# Patient Record
Sex: FEMALE | Race: BLACK OR AFRICAN AMERICAN | Employment: OTHER | URBAN - METROPOLITAN AREA
[De-identification: names, ages, dates, MRNs, and addresses within clinical notes are randomized per-mention and may not be internally consistent; named-entity substitution may affect disease eponyms.]

---

## 2017-01-04 ENCOUNTER — HOSPITAL ENCOUNTER (OUTPATIENT)
Dept: PHYSICAL THERAPY | Age: 72
Discharge: HOME | End: 2017-01-04
Payer: MEDICARE

## 2017-01-04 PROCEDURE — 97110 THERAPEUTIC EXERCISES: CPT

## 2017-01-04 PROCEDURE — 97112 NEUROMUSCULAR REEDUCATION: CPT

## 2017-01-04 PROCEDURE — 97014 ELECTRIC STIMULATION THERAPY: CPT

## 2017-01-04 NOTE — PROGRESS NOTES
PT DAILY TREATMENT NOTE - [facility]     Patient Name: Armond Chan  Date:2017  : 1945  [x]  Patient  Verified  Payor: Ana Paula Main / Plan: [place] MED[place] B / Product Type: Medicare /    In time:230  Out time:313  Total Treatment Time (min): 43  Total Timed Codes (min): 33  1:1 Treatment Time ( [address] only): 33  Visit #: 2 of 8    Treatment Area: Pain in right hip [M25.551]    SUBJECTIVE  Pain Level (0-10 scale): 0  Any medication changes, allergies to medications, adverse drug reactions, diagnosis change, or new procedure performed?: [x] No    [] Yes (see summary sheet for update)  Subjective functional status/changes:   [] No changes reported  \"Not having pain now. \"    OBJECTIVE    Modality rationale: decrease pain and increase tissue extensibility to improve the patients ability to improve ease with mobility.    Min Type Additional Details   10 [x] Estim:  [x]Unatt       [x]IFC  []Premod                        []Other:  []w/ice   [x]w/heat  Position: seated  Location:R low back    [] Estim: []Att    []TENS instruct  []NMES                    []Other:  []w/US   []w/ice   []w/heat  Position:  Location:    []  Traction: [] Cervical       []Lumbar                       [] Prone          []Supine                       []Intermittent   []Continuous Lbs:  [] before manual  [] after manual    []  Ultrasound: []Continuous   [] Pulsed                           []1MHz   []3MHz Location:  W/cm2:    []  Iontophoresis with dexamethasone         Location: [] Take home patch   [] In clinic    []  Ice     []  heat  []  Ice massage  []  Laser   []  Anodyne Position:  Location:    []  Laser with stim  []  Other: Position:  Location:    []  Vasopneumatic Device Pressure:       [] lo [] med [] hi   Temperature: [] lo [] med [] hi   [x] Skin assessment post-treatment:  [x]intact []redness- no adverse reaction    []redness  adverse reaction:       8 min Manual Therapy:  Gentle STM to piriformis, gluts, & R l/s paraspinals Rationale: decrease pain, increase ROM, increase tissue extensibility, decrease trigger points and increase postural awareness to improve ease with mobility. 25 min Neuromuscular Re-education:  [x]  See flow sheet :balance training, weight shifting activities. Rationale: increase ROM, increase strength, improve coordination, improve balance and increase proprioception  to decrease fall risk. With   [] TE   [] TA   [] neuro   [] other: Patient Education: [x] Review HEP    [] Progressed/Changed HEP based on:   [] positioning   [] body mechanics   [] transfers   [] heat/ice application    [] other:      Other Objective/Functional Measures:      Pain Level (0-10 scale) post treatment: 0    ASSESSMENT/Changes in Function:    Patient will continue to benefit from skilled PT services to modify and progress therapeutic interventions, address functional mobility deficits, address ROM deficits, address strength deficits, analyze and address soft tissue restrictions, analyze and cue movement patterns, analyze and modify body mechanics/ergonomics, assess and modify postural abnormalities, address imbalance/dizziness and instruct in home and community integration to attain remaining goals. []  See Plan of Care  []  See progress note/recertification  []  See Discharge Summary         Progress towards goals / Updated goals:  Short Term Goals: To be accomplished in 1 weeks:  1. Pt will be IND and compliant with HEP for self-management of symptoms. Long Term Goals: To be accomplished in 10 treatments:  1. Pt will improve B hip strength to 5/5 to improve functional gait tolerance. 2. Pt will be able to perform sit to stand transfers without pain from a low chair to improve functional transfer tolerance. 3. Pt will present to the clinic for 3 consecutive sessions with level pelvis as an indicator of improved stability.   4. Pt will improve FOTO score by 10 points as an indicator of improved activity tolerance. PLAN  []  Upgrade activities as tolerated     [x]  Continue plan of care  []  Update interventions per flow sheet       []  Discharge due to:_  []  Other:_      Rachel Luo, PT 1/4/2017  2:42 PM    No future appointments.

## 2017-01-12 ENCOUNTER — HOSPITAL ENCOUNTER (OUTPATIENT)
Dept: PHYSICAL THERAPY | Age: 72
Discharge: HOME | End: 2017-01-12
Payer: MEDICARE

## 2017-01-12 PROCEDURE — 97112 NEUROMUSCULAR REEDUCATION: CPT

## 2017-01-12 PROCEDURE — 97014 ELECTRIC STIMULATION THERAPY: CPT

## 2017-01-12 NOTE — PROGRESS NOTES
PT DAILY TREATMENT NOTE - [facility]     Patient Name: Herbert Andrews  Date:2017  : 1945  [x]  Patient  Verified  Payor: Robert Many / Plan: [place] MEDICARE PART B / Product Type: Medicare /    In time:305  Out time:354  Total Treatment Time (min): 49  Total Timed Codes (min): 39  1:1 Treatment Time ( [address] only): 25   Visit #: 3 of 10    Treatment Area: Pain in right hip [M25.551]    SUBJECTIVE  Pain Level (0-10 scale):  2  Any medication changes, allergies to medications, adverse drug reactions, diagnosis change, or new procedure performed?: [x] No    [] Yes (see summary sheet for update)  Subjective functional status/changes:   [] No changes reported  It is getting much better,     OBJECTIVE    Modality rationale: decrease inflammation, decrease pain and increase tissue extensibility to improve the patients ability to regain standing and activity tolerance    Min Type Additional Details   10 [x] Estim:  [x]Unatt       [x]IFC  []Premod                        []Other:  []w/ice   [x]w/heat  Position:setaed  Location:LB and SIJ    [] Estim: []Att    []TENS instruct  []NMES                    []Other:  []w/US   []w/ice   []w/heat  Position:  Location:    []  Traction: [] Cervical       []Lumbar                       [] Prone          []Supine                       []Intermittent   []Continuous Lbs:  [] before manual  [] after manual    []  Ultrasound: []Continuous   [] Pulsed                           []1MHz   []3MHz W/cm2:  Location:    []  Iontophoresis with dexamethasone         Location: [] Take home patch   [] In clinic    []  Ice     []  heat  []  Ice massage  []  Laser   []  Anodyne Position:  Location:    []  Laser with stim  []  Other:  Position:  Location:    []  Vasopneumatic Device Pressure:       [] lo [] med [] hi   Temperature: [] lo [] med [] hi   [x] Skin assessment post-treatment:  [x]intact []redness- no adverse reaction    []redness  adverse reaction:            29 min Neuromuscular Re-education:  [x]  See flow sheet :  ADDED lateral ambulation with emphasis on upright posture, minisquats with manual assistance for form   Rationale: increase ROM, increase strength, improve coordination, improve balance and increase proprioception to decrease fall risk. 10 min Manual Therapy:  MFR pelvic diaphragm, stm glut, piriformis, QL    Rationale: decrease pain, increase ROM, increase tissue extensibility, decrease trigger points and increase postural awareness to improve ease with mobility.              With   [] TE   [] TA   [x] neuro   [] other: Patient Education: [x] Review HEP    [] Progressed/Changed HEP based on:   [x] positioning   [x] body mechanics   [] transfers   [] heat/ice application    [] other:      Other Objective/Functional Measures: Right ant innominate with + Fabers      Pain Level (0-10 scale) post treatment: 1    ASSESSMENT/Changes in Function: patient very pleasant and cooperative, motivated to improve . Seeing good response despite not maintaining alignment   Patient will continue to benefit from skilled PT services to modify and progress therapeutic interventions, address functional mobility deficits, address ROM deficits, address strength deficits, analyze and address soft tissue restrictions, analyze and cue movement patterns, analyze and modify body mechanics/ergonomics, assess and modify postural abnormalities and instruct in home and community integration to attain remaining goals. []  See Plan of Care  []  See progress note/recertification  []  See Discharge Summary         Progress towards goals / Updated goals:  Short Term Goals: To be accomplished in 1 weeks:  1. Pt will be IND and compliant with HEP for self-management of symptoms. MET   Long Term Goals: To be accomplished in 10 treatments:  1. Pt will improve B hip strength to 5/5 to improve functional gait tolerance. PROGRESSING - cues for proper form, needed  2.  Pt will be able to perform sit to stand transfers without pain from a low chair to improve functional transfer tolerance. PROGERSSING  3. Pt will present to the clinic for 3 consecutive sessions with level pelvis as an indicator of improved stability. NOT MET, less painful with dysfunction  4. Pt will improve FOTO score by 10 points as an indicator of improved activity tolerance.   Assess at 5th visit        PLAN  []  Upgrade activities as tolerated     [x]  Continue plan of care  []  Update interventions per flow sheet       []  Discharge due to:_  []  Other:_      Nelson Last, PT 1/12/2017  3:07 PM    Future Appointments  Date Time Provider [facility]c Mcm[facility]   1/16/2017 3:00 PM JOSAFAT Mckay [facility]   1/18/2017 3:30 PM Kimberly People, PT [facility]   1/23/2017 3:00 PM Kimberly People, PT [facility]   1/25/2017 3:00 PM Og Edward PTA [facility]   1/30/2017 3:00 PM Kimberly People, PT [facility]

## 2017-01-16 ENCOUNTER — HOSPITAL ENCOUNTER (OUTPATIENT)
Dept: PHYSICAL THERAPY | Age: 72
Discharge: HOME | End: 2017-01-16
Payer: MEDICARE

## 2017-01-16 PROCEDURE — 97140 MANUAL THERAPY 1/> REGIONS: CPT

## 2017-01-16 PROCEDURE — 97112 NEUROMUSCULAR REEDUCATION: CPT

## 2017-01-16 NOTE — PROGRESS NOTES
PT DAILY TREATMENT NOTE - [facility]     Patient Name: Zoila Melendez  Date:2017  : 1945  [x]  Patient  Verified  Payor: Niesha Garcia / Plan: [place] MEDICARE PART B / Product Type: Medicare /    In time:302  Out time:344  Total Treatment Time (min): 42  Total Timed Codes (min): 32  1:1 Treatment Time ( [address] only): 32   Visit #: 4 of 10    Treatment Area: Pain in right hip [M25.551]    SUBJECTIVE  Pain Level (0-10 scale): 0  Any medication changes, allergies to medications, adverse drug reactions, diagnosis change, or new procedure performed?: [x] No    [] Yes (see summary sheet for update)  Subjective functional status/changes:   [] No changes reported  \"No pain right now. \"     OBJECTIVE    Modality rationale: decrease pain to improve the patients ability to improve mobility and positional tolerance   Min Type Additional Details    [] Estim:  []Unatt       []IFC  []Premod                        []Other:  []w/ice   []w/heat  Position:  Location:    [] Estim: []Att    []TENS instruct  []NMES                    []Other:  []w/US   []w/ice   []w/heat  Position:  Location:    []  Traction: [] Cervical       []Lumbar                       [] Prone          []Supine                       []Intermittent   []Continuous Lbs:  [] before manual  [] after manual    []  Ultrasound: []Continuous   [] Pulsed                           []1MHz   []3MHz W/cm2:  Location:    []  Iontophoresis with dexamethasone         Location: [] Take home patch   [] In clinic   10 []  Ice     [x]  heat  []  Ice massage  []  Laser   []  Anodyne Position: prone  Location: lower back    []  Laser with stim  []  Other:  Position:  Location:    []  Vasopneumatic Device Pressure:       [] lo [] med [] hi   Temperature: [] lo [] med [] hi   [x] Skin assessment post-treatment:  [x]intact []redness- no adverse reaction    []redness  adverse reaction:     22 min Neuromuscular Re-education:  [x]  See flow sheet : core and pelvic stabilization activities Rationale: increase ROM, increase strength, improve coordination, improve balance and increase proprioception  to improve the patients ability to improve core activation     10 min Manual Therapy:  STM R l/s paraspinals, MFR to R QL, MET to correct for R anterior rotation of the innominate   Rationale: decrease pain, increase ROM, increase tissue extensibility, decrease trigger points and increase postural awareness to improve mobility and positional tolerance          With   [] TE   [] TA   [x] neuro   [] other: Patient Education: [x] Review HEP    [] Progressed/Changed HEP based on:   [x] positioning   [x] body mechanics   [] transfers   [] heat/ice application    [] other:      Other Objective/Functional Measures:      Pain Level (0-10 scale) post treatment: 0    ASSESSMENT/Changes in Function: Pt reports improved overall pain during the day, but has elevated pain at night of up to 4/10. Continues to have poor pelvic stability carryover. Patient will continue to benefit from skilled PT services to modify and progress therapeutic interventions, address functional mobility deficits, address ROM deficits, address strength deficits, analyze and address soft tissue restrictions, analyze and cue movement patterns, analyze and modify body mechanics/ergonomics, assess and modify postural abnormalities, address imbalance/dizziness and instruct in home and community integration to attain remaining goals. [x]  See Plan of Care  []  See progress note/recertification  []  See Discharge Summary         Progress towards goals / Updated goals:  Short Term Goals: To be accomplished in 1 weeks:  1. Pt will be IND and compliant with HEP for self-management of symptoms. MET   Long Term Goals: To be accomplished in 10 treatments:  1. Pt will improve B hip strength to 5/5 to improve functional gait tolerance. PROGRESSING - cues for proper form, needed  2.  Pt will be able to perform sit to stand transfers without pain from a low chair to improve functional transfer tolerance. PROGERSSING  3. Pt will present to the clinic for 3 consecutive sessions with level pelvis as an indicator of improved stability. NOT MET, less painful with dysfunction  4. Pt will improve FOTO score by 10 points as an indicator of improved activity tolerance.    Assess at 5th visit     PLAN  []  Upgrade activities as tolerated     [x]  Continue plan of care  []  Update interventions per flow sheet       []  Discharge due to:_  []  Other:_      Michael Balderrama PTA, [facility] 1/16/2017  3:50 PM    Future Appointments  Date Time Provider Bc Mcmullen   1/16/2017 3:00 PM Michael Balderrama PTA [facility]   1/18/2017 3:30 PM Lily Robertson, PT [facility]   1/23/2017 3:00 PM Lily Robertson, PT [facility]   1/25/2017 3:00 PM Michael Balderrama PTA [facility]   1/30/2017 3:00 PM Lily Robertson, PT [facility]

## 2017-01-18 ENCOUNTER — HOSPITAL ENCOUNTER (OUTPATIENT)
Dept: PHYSICAL THERAPY | Age: 72
Discharge: HOME | End: 2017-01-18
Payer: MEDICARE

## 2017-01-18 PROCEDURE — G8983 BODY POS D/C STATUS: HCPCS

## 2017-01-18 PROCEDURE — G8982 BODY POS GOAL STATUS: HCPCS

## 2017-01-18 PROCEDURE — 97112 NEUROMUSCULAR REEDUCATION: CPT

## 2017-01-18 PROCEDURE — 97140 MANUAL THERAPY 1/> REGIONS: CPT

## 2017-01-18 NOTE — PROGRESS NOTES
PT DAILY TREATMENT NOTE - [facility]     Patient Name: Renetta Leonard  Date:2017  : 1945  [x]  Patient  Verified  Payor: Hussain Wheat / Plan: [place] MEDICARE PART B / Product Type: Medicare /    In time:308  Out time:355  Total Treatment Time (min): 37  Total Timed Codes (min): 37  1:1 Treatment Time ( [address] only): 25   Visit #: 5 of 10    Treatment Area: Pain in right hip [M25.551]    SUBJECTIVE  Pain Level (0-10 scale): 1-2  Any medication changes, allergies to medications, adverse drug reactions, diagnosis change, or new procedure performed?: [x] No    [] Yes (see summary sheet for update)  Subjective functional status/changes:   [] No changes reported  See d/c    OBJECTIVE    Modality rationale: decrease pain and increase tissue extensibility to improve the patients ability to improve ease with mobility.    Min Type Additional Details    [] Estim:  []Unatt       []IFC  []Premod                        []Other:  []w/ice   []w/heat  Position:  Location:    [] Estim: []Att    []TENS instruct  []NMES                    []Other:  []w/US   []w/ice   []w/heat  Position:  Location:    []  Traction: [] Cervical       []Lumbar                       [] Prone          []Supine                       []Intermittent   []Continuous Lbs:  [] before manual  [] after manual    []  Ultrasound: []Continuous   [] Pulsed                           []1MHz   []3MHz W/cm2:  Location:    []  Iontophoresis with dexamethasone         Location: [] Take home patch   [] In clinic   10 []  Ice     [x]  heat  []  Ice massage  []  Laser   []  Anodyne Position:prone  Location:low back    []  Laser with stim  []  Other:  Position:  Location:    []  Vasopneumatic Device Pressure:       [] lo [] med [] hi   Temperature: [] lo [] med [] hi   [x] Skin assessment post-treatment:  [x]intact []redness- no adverse reaction    []redness  adverse reaction:       27 min Neuromuscular Re-education:  [x]  See flow sheet :   Rationale: increase ROM, increase strength and improve coordination  to improve the patients ability to return to walking program.     10 min Manual Therapy:  STM R l/s paraspinals, MFR to R QL, MET pelvic shotgun to assess pelvic shear (-)   Rationale: decrease pain, increase ROM, increase tissue extensibility and decrease trigger points to normalize gait. With   [] TE   [] TA   [] neuro   [] other: Patient Education: [x] Review HEP    [] Progressed/Changed HEP based on:   [] positioning   [] body mechanics   [] transfers   [] heat/ice application    [] other:      Other Objective/Functional Measures: FOTO: 75     Pain Level (0-10 scale) post treatment: 0    ASSESSMENT/Changes in Function: see d/c  []  See Plan of Care  []  See progress note/recertification  [x]  See Discharge Summary         Progress towards goals / Updated goals:  Short Term Goals: To be accomplished in 1 weeks:  1. Pt will be IND and compliant with HEP for self-management of symptoms. MET   Long Term Goals: To be accomplished in 10 treatments:  1. Pt will improve B hip strength to 5/5 to improve functional gait tolerance. MET- cues for proper form, needed  2. Pt will be able to perform sit to stand transfers without pain from a low chair to improve functional transfer tolerance. MET  3. Pt will present to the clinic for 3 consecutive sessions with level pelvis. IMPROVED; less pain - maintain neutral pelvis X 1 visit, patient feels ready to d/c to HEP at this time. 4. Pt will improve FOTO score by 10 points as an indicator of improved activity tolerance.   MET: 75    PLAN  []  Upgrade activities as tolerated     []  Continue plan of care  []  Update interventions per flow sheet       [x]  Discharge   []  Other:_      Cassandra Rivera PT 1/18/2017  4:04 PM    Future Appointments  Date Time Provider Bc Mcmullen   [facility]2017 [facility]:00 [facility]   1/25/2017 3:00 PM [facility]   1/30/2017 3:00 PM Julian Odonnell Sherry Up

## 2017-01-18 NOTE — PROGRESS NOTES
[facility] [address] [phone] fax    t  Patient Name: Yfn Cabrera :    Medical   Diagnosis: Pain in right hip [M25.551] Treatment Diagnosis: R hip pain   Onset Date: 6 months ago       Referral Source: Margarette Kellogg MD Start of Care [facility]): 2016   Prior Hospitalization: See medical history Provider #: [id number]   Prior Level of Function: Functional IND, IND ambulator   Comorbidities: Diabetes, HBP,    Medications: Verified on Patient Summary List       Visits from Start of Care: 5    Missed Visits: 0  Reporting Period :   to 17      Short Term Goals: To be accomplished in 1 weeks:  1. Pt will be IND and compliant with HEP for self-management of symptoms. MET   Long Term Goals: To be accomplished in 10 treatments:  1. Pt will improve B hip strength to 5/5 to improve functional gait tolerance. MET- cues for proper form, needed  2. Pt will be able to perform sit to stand transfers without pain from a low chair to improve functional transfer tolerance. MET  3. Pt will present to the clinic for 3 consecutive sessions with level pelvis. IMPROVED; less pain - maintain neutral pelvis X 1 visit, patient feels ready to d/c to HEP at this time. 4. Pt will improve FOTO score by 10 points as an indicator of improved activity tolerance. MET: 75    Functional Gains: \"everything\", pain, walking, getting up from chair. Functional Deficits: nothing per patient  % improvement: 100%  Pain   Average: 0/10       Best: 0/10     Worst: 1/10 - at night sometimes. Patient Goal: \"to continue doing what Im doing to manage the tightness and to keep walking. \"      Assessment/Summary of care: Ms. Diana Mclaughlin has been a pleasure to treat and has made excellent progress with therapy for improved pain & pelvic alignment. At this time the patient is independent in her exercise program and requests transition to HEP.  We will discharge from skilled PT services. G-Codes (GP)  Mobility   [id number] Goal  CJ= 20-39%  D/C  CJ= 20-39%    The severity rating is based on clinical judgment and theFOTO score.   RECOMMENDATIONS:  [x]Discontinue therapy:  [x]Patient has reached or is progressing toward set goals      []Patient is non-compliant or has abdicated      []Due to lack of appreciable progress towards set [id number] [place], PT 1/18/2017 3:50 PM

## 2017-01-23 ENCOUNTER — APPOINTMENT (OUTPATIENT)
Dept: PHYSICAL THERAPY | Age: 72
End: 2017-01-23
Payer: MEDICARE

## 2017-01-25 ENCOUNTER — APPOINTMENT (OUTPATIENT)
Dept: PHYSICAL THERAPY | Age: 72
End: 2017-01-25
Payer: MEDICARE

## 2017-01-30 ENCOUNTER — APPOINTMENT (OUTPATIENT)
Dept: PHYSICAL THERAPY | Age: 72
End: 2017-01-30
Payer: MEDICARE